# Patient Record
Sex: FEMALE | Race: WHITE | NOT HISPANIC OR LATINO | URBAN - METROPOLITAN AREA
[De-identification: names, ages, dates, MRNs, and addresses within clinical notes are randomized per-mention and may not be internally consistent; named-entity substitution may affect disease eponyms.]

---

## 2017-01-26 ENCOUNTER — APPOINTMENT (OUTPATIENT)
Dept: WOMENS IMAGING | Facility: HOSPITAL | Age: 47
End: 2017-01-26

## 2017-01-26 PROCEDURE — 77067 SCR MAMMO BI INCL CAD: CPT | Performed by: RADIOLOGY

## 2017-02-20 ENCOUNTER — LAB (OUTPATIENT)
Dept: LAB | Facility: HOSPITAL | Age: 47
End: 2017-02-20

## 2017-02-20 ENCOUNTER — CONSULT (OUTPATIENT)
Dept: ONCOLOGY | Facility: CLINIC | Age: 47
End: 2017-02-20

## 2017-02-20 VITALS
BODY MASS INDEX: 32.04 KG/M2 | HEART RATE: 77 BPM | TEMPERATURE: 98 F | WEIGHT: 211.4 LBS | OXYGEN SATURATION: 99 % | SYSTOLIC BLOOD PRESSURE: 128 MMHG | DIASTOLIC BLOOD PRESSURE: 74 MMHG | HEIGHT: 68 IN | RESPIRATION RATE: 16 BRPM

## 2017-02-20 DIAGNOSIS — Z01.89 EXAMINATION FOR, LABORATORY: Primary | ICD-10-CM

## 2017-02-20 DIAGNOSIS — R07.2 PRECORDIAL PAIN: ICD-10-CM

## 2017-02-20 DIAGNOSIS — E53.8 B12 DEFICIENCY: ICD-10-CM

## 2017-02-20 DIAGNOSIS — E61.1 IRON DEFICIENCY: ICD-10-CM

## 2017-02-20 DIAGNOSIS — D75.839 THROMBOCYTOSIS: Primary | ICD-10-CM

## 2017-02-20 LAB
BASOPHILS # BLD AUTO: 0.05 10*3/MM3 (ref 0–0.1)
BASOPHILS NFR BLD AUTO: 0.8 % (ref 0–1.1)
DEPRECATED RDW RBC AUTO: 44.1 FL (ref 37–49)
EOSINOPHIL # BLD AUTO: 0.16 10*3/MM3 (ref 0–0.36)
EOSINOPHIL NFR BLD AUTO: 2.6 % (ref 1–5)
ERYTHROCYTE [DISTWIDTH] IN BLOOD BY AUTOMATED COUNT: 14.2 % (ref 11.7–14.5)
FERRITIN SERPL-MCNC: 17.3 NG/ML (ref 11–207)
HCT VFR BLD AUTO: 38.9 % (ref 34–45)
HGB BLD-MCNC: 12.5 G/DL (ref 11.5–14.9)
HOLD SPECIMEN: NORMAL
IMM GRANULOCYTES # BLD: 0.01 10*3/MM3 (ref 0–0.03)
IMM GRANULOCYTES NFR BLD: 0.2 % (ref 0–0.5)
IRON 24H UR-MRATE: 54 MCG/DL (ref 37–145)
IRON SATN MFR SERPL: 10 % (ref 14–48)
LYMPHOCYTES # BLD AUTO: 2.38 10*3/MM3 (ref 1–3.5)
LYMPHOCYTES NFR BLD AUTO: 38.2 % (ref 20–49)
MCH RBC QN AUTO: 27.5 PG (ref 27–33)
MCHC RBC AUTO-ENTMCNC: 32.1 G/DL (ref 32–35)
MCV RBC AUTO: 85.7 FL (ref 83–97)
MONOCYTES # BLD AUTO: 0.47 10*3/MM3 (ref 0.25–0.8)
MONOCYTES NFR BLD AUTO: 7.5 % (ref 4–12)
NEUTROPHILS # BLD AUTO: 3.16 10*3/MM3 (ref 1.5–7)
NEUTROPHILS NFR BLD AUTO: 50.7 % (ref 39–75)
NRBC BLD MANUAL-RTO: 0 /100 WBC (ref 0–0)
PLATELET # BLD AUTO: 350 10*3/MM3 (ref 150–375)
PMV BLD AUTO: 10 FL (ref 8.9–12.1)
RBC # BLD AUTO: 4.54 10*6/MM3 (ref 3.9–5)
TIBC SERPL-MCNC: 535 MCG/DL (ref 249–505)
TRANSFERRIN SERPL-MCNC: 382 MG/DL (ref 200–360)
VIT B12 BLD-MCNC: 460 PG/ML (ref 250–999)
WBC NRBC COR # BLD: 6.23 10*3/MM3 (ref 4–10)
WHOLE BLOOD HOLD SPECIMEN: NORMAL

## 2017-02-20 PROCEDURE — 85025 COMPLETE CBC W/AUTO DIFF WBC: CPT

## 2017-02-20 PROCEDURE — 82607 VITAMIN B-12: CPT | Performed by: INTERNAL MEDICINE

## 2017-02-20 PROCEDURE — 83540 ASSAY OF IRON: CPT | Performed by: INTERNAL MEDICINE

## 2017-02-20 PROCEDURE — 84466 ASSAY OF TRANSFERRIN: CPT | Performed by: INTERNAL MEDICINE

## 2017-02-20 PROCEDURE — 36415 COLL VENOUS BLD VENIPUNCTURE: CPT | Performed by: INTERNAL MEDICINE

## 2017-02-20 PROCEDURE — 82728 ASSAY OF FERRITIN: CPT | Performed by: INTERNAL MEDICINE

## 2017-02-20 PROCEDURE — 99244 OFF/OP CNSLTJ NEW/EST MOD 40: CPT | Performed by: INTERNAL MEDICINE

## 2017-02-20 RX ORDER — METOPROLOL TARTRATE 100 MG/1
100 TABLET ORAL
COMMUNITY

## 2017-02-20 RX ORDER — SPIRONOLACTONE 100 MG/1
100 TABLET, FILM COATED ORAL DAILY
COMMUNITY

## 2017-02-20 RX ORDER — FERROUS SULFATE 325(65) MG
325 TABLET ORAL DAILY
Start: 2017-02-20

## 2017-02-20 NOTE — PROGRESS NOTES
Subjective     REASON FOR CONSULTATION:  Provide an opinion on any further workup or treatment on:    · Thrombocytosis                       REQUESTING PHYSICIAN: Art Lorenz MD      RECORDS OBTAINED:  Records of the patients history including those obtained from the referring provider were reviewed and summarized in detail.    HISTORY OF PRESENT ILLNESS:      Margarette Miles is a 47 y.o. patient was referred by Art Lorenz MD for evaluation of thrombocytosis.    Patient was found to have a platelet count of 437,000 on 2/2/17.  Prior to that, labs showed a high normal platelet count.  The patient has no prior history of thrombosis.    Patient developed pain in the right leg and posterior aspect of the knee a month ago.  She was evaluated at an emergency department endovenous upper was done which was negative.  Shortly after, she started experiencing retrosternal chest pain.  It is associated with shortness of breath especially with activities.  There is no preceding trauma.  She is not having hemoptysis.  She does reports occasional cough productive of clear sputum.    Patient reports history of B12 deficiency.  She took B12 tablets in the past.    The patient reports history of hemorrhoids.  She is going to have a colonoscopy next month.      Past Medical History   Diagnosis Date   • H/O Pityriasis versicolor    • H/O Pleurisy    • H/O uterine leiomyoma    • Hypertension    • Obesity        Past Surgical History   Procedure Laterality Date   • Uterine fibroid surgery  10/14/2014     Laparoscopic ablation uterine fibroids.   • Tubal abdominal ligation  01/01/2006   • Colonoscopy  01/01/1993         MEDICATIONS:    Current Outpatient Prescriptions:   •  Cholecalciferol (VITAMIN D-3 PO), Take  by mouth Daily., Disp: , Rfl:   •  FOLIC ACID PO, Take  by mouth., Disp: , Rfl:   •  metoprolol tartrate (LOPRESSOR) 100 MG tablet, Take 100 mg by mouth., Disp: , Rfl:   •  Probiotic Product (PROBIOTIC DAILY PO), Take   "by mouth., Disp: , Rfl:   •  spironolactone (ALDACTONE) 100 MG tablet, Take 100 mg by mouth Daily., Disp: , Rfl:      ALLERGIES:  No Known Allergies     Social History     Social History   • Marital status: Unknown     Spouse name: N/A   • Number of children: N/A   • Years of education: N/A     Occupational History   • Not on file.     Social History Main Topics   • Smoking status: Former Smoker   • Smokeless tobacco: Not on file   • Alcohol use Not on file   • Drug use: Not on file   • Sexual activity: Not on file     Other Topics Concern   • Not on file     Social History Narrative       Cancer-related family history is not on file.    REVIEW OF SYSTEMS:  GENERAL:  No fever or chills. No weight change.    SKIN:  No rashes or lesions.  HEME/LYMPH: Thrombocytosis.  EYES:  No vision changes or diplopia.  ENT:  No mouth or gum bleeding, epistaxis, hoarseness or dysphagia.  RESPIRATORY:  Shortness of breath.  CVS:  Regular first and chest pain.  GI:  No abdominal pain, nausea, vomiting, constipation, diarrhea.  Patient reports hemorrhoids.  :  No dysuria, hematuria or increased frequency.   MUSCULOSKELETAL:  No bone pain or joint stiffness.  NEUROLOGICAL:  No dizziness, global weakness, loss of consciousness or seizures.  PSYCHIATRIC:  No anxiety, mood changes or depression.  .    Objective   VITAL SIGNS:  Vitals:    02/20/17 1449   BP: 128/74   Pulse: 77   Resp: 16   Temp: 98 °F (36.7 °C)   TempSrc: Oral   SpO2: 99%   Weight: 211 lb 6.4 oz (95.9 kg)   Height: 68.11\" (173 cm)  Comment: new ht/new pt   PainSc: 0-No pain       Wt Readings from Last 3 Encounters:   02/20/17 211 lb 6.4 oz (95.9 kg)       PHYSICAL EXAMINATION  GENERAL:  The patient appears in good general condition not in acute distress  SKIN:  No skin rashes or lesions. No ecchymosis or petechiae.  HEAD:  Normocephalic.  EYES: No jaundice. No pallor.   NECK:  Supple with good ROM. No thyromegaly or masses.  LYMPHATICS:  No cervical, supraclavicular or " axillary lymphadenopathy.  CHEST:  Lungs clear to auscultation. No added sounds.  CARDIAC:  Normal S1 & S2. No murmurs.  ABDOMEN:  Soft and non-tender. No palpable hepatosplenomegaly or masses.  EXTREMITIES:  No cyanosis or edema. No calf tenderness.  NEUROLOGICAL:  No focal neurological deficits.  .    RESULT REVIEW:     Results from last 7 days  Lab Units 02/20/17  1438   WBC 10*3/mm3 6.23   NEUTROS ABS 10*3/mm3 3.16   HEMOGLOBIN g/dL 12.5   HEMATOCRIT % 38.9   PLATELETS 10*3/mm3 350       Results from last 7 days  Lab Units 02/20/17  1432   FERRITIN ng/mL 17.30   IRON mcg/dL 54   TIBC mcg/dL 535*     Lab Results   Component Value Date    UYEBMTFC36 460 02/20/2017       CBC from 2/2/17 revealed a hemoglobin of 13.0, WBC 5600 platelets 437,000.    CBC from 2/11/15 revealed a hemoglobin of 11.2 WBC 5400 platelets 392,000    CBC from 10/14/14 revealed hemoglobin 10.8 and platelets of 348,000.    CBC from on 1/14/14 revealed hemoglobin 11.7 WBC 7300 platelets 379,000.      Assessment/Plan   1.  Thrombocytosis.  The highest platelet count was 437,000.  Patient did not have any recent infections.  WBC and hemoglobin are normal.  I obtained iron studies today and she does have iron deficiency which would explain the thrombocytosis.  Therefore, we will start patient on ferrous sulfate 325 mg once a day.  With treatment of the iron deficiency, we would expect improvement in the platelet count.    2.  Retrosternal chest pain for the past month associated with shortness of breath.  This is concerning for pulmonary embolism.  The patient has prior history of smoking and her family history is positive for multiple malignancies.  I recommended obtaining a CT scan of the chest with PE protocol.  This will be done this week and will contact her with the results.    3.  Iron deficiency area with the patient's positive family history for colon cancer, she is going to have a colonoscopy next month.    4.  History of B12 deficiency.   I obtained B12 level and it is normal.    If the patient's CT scan is negative, we will see her in follow-up in 2 months with CBC, ferritin and iron panel.        Damon Denise MD  02/20/17

## 2017-02-23 ENCOUNTER — HOSPITAL ENCOUNTER (OUTPATIENT)
Dept: CT IMAGING | Facility: HOSPITAL | Age: 47
Discharge: HOME | End: 2017-02-23
Attending: INTERNAL MEDICINE | Admitting: INTERNAL MEDICINE

## 2017-02-23 DIAGNOSIS — R07.2 PRECORDIAL PAIN: ICD-10-CM

## 2017-02-23 LAB — CREAT BLDA-MCNC: 0.8 MG/DL (ref 0.6–1.3)

## 2017-02-23 PROCEDURE — 71275 CT ANGIOGRAPHY CHEST: CPT

## 2017-02-23 PROCEDURE — 0 IOPAMIDOL PER 1 ML: Performed by: INTERNAL MEDICINE

## 2017-02-23 PROCEDURE — 82565 ASSAY OF CREATININE: CPT

## 2017-02-23 RX ADMIN — IOPAMIDOL 95 ML: 755 INJECTION, SOLUTION INTRAVENOUS at 11:47

## 2017-02-23 NOTE — NURSING NOTE
Look and let go for CT Scan of chest.  Dr. Mckeon said the scan was negative and the patient could go.  IV pulled

## 2017-03-28 PROBLEM — Z12.11 SCREENING FOR COLONIC NEOPLASIA: Status: ACTIVE | Noted: 2017-03-29

## 2017-05-10 VITALS
WEIGHT: 200 LBS | SYSTOLIC BLOOD PRESSURE: 140 MMHG | RESPIRATION RATE: 14 BRPM | SYSTOLIC BLOOD PRESSURE: 129 MMHG | TEMPERATURE: 97.3 F | DIASTOLIC BLOOD PRESSURE: 75 MMHG | RESPIRATION RATE: 13 BRPM | DIASTOLIC BLOOD PRESSURE: 73 MMHG | OXYGEN SATURATION: 98 % | SYSTOLIC BLOOD PRESSURE: 120 MMHG | RESPIRATION RATE: 19 BRPM | DIASTOLIC BLOOD PRESSURE: 78 MMHG | HEART RATE: 65 BPM | RESPIRATION RATE: 30 BRPM | DIASTOLIC BLOOD PRESSURE: 76 MMHG | SYSTOLIC BLOOD PRESSURE: 131 MMHG | HEART RATE: 60 BPM | HEIGHT: 67 IN | OXYGEN SATURATION: 99 % | DIASTOLIC BLOOD PRESSURE: 66 MMHG | DIASTOLIC BLOOD PRESSURE: 104 MMHG | HEART RATE: 64 BPM | SYSTOLIC BLOOD PRESSURE: 119 MMHG | HEART RATE: 66 BPM | DIASTOLIC BLOOD PRESSURE: 94 MMHG | RESPIRATION RATE: 16 BRPM | HEART RATE: 68 BPM | SYSTOLIC BLOOD PRESSURE: 150 MMHG | TEMPERATURE: 96.8 F | HEART RATE: 56 BPM | HEART RATE: 75 BPM | OXYGEN SATURATION: 100 %

## 2017-05-11 ENCOUNTER — AMBULATORY SURGICAL CENTER (AMBULATORY)
Dept: URBAN - METROPOLITAN AREA SURGERY 17 | Facility: SURGERY | Age: 47
End: 2017-05-11
Payer: COMMERCIAL

## 2017-05-11 DIAGNOSIS — Z12.11 ENCOUNTER FOR SCREENING FOR MALIGNANT NEOPLASM OF COLON: ICD-10-CM

## 2017-05-11 PROCEDURE — 45378 DIAGNOSTIC COLONOSCOPY: CPT | Performed by: INTERNAL MEDICINE

## 2017-05-11 RX ADMIN — LIDOCAINE HYDROCHLORIDE 25 MG: 10 INJECTION, SOLUTION EPIDURAL; INFILTRATION; INTRACAUDAL; PERINEURAL at 09:33

## 2017-05-11 RX ADMIN — PROPOFOL 50 MG: 10 INJECTION, EMULSION INTRAVENOUS at 09:40

## 2017-05-11 RX ADMIN — PROPOFOL 100 MG: 10 INJECTION, EMULSION INTRAVENOUS at 09:34

## 2018-03-22 ENCOUNTER — APPOINTMENT (OUTPATIENT)
Dept: WOMENS IMAGING | Facility: HOSPITAL | Age: 48
End: 2018-03-22

## 2018-03-22 PROCEDURE — 77063 BREAST TOMOSYNTHESIS BI: CPT | Performed by: RADIOLOGY

## 2018-03-22 PROCEDURE — 77067 SCR MAMMO BI INCL CAD: CPT | Performed by: RADIOLOGY

## 2019-05-17 ENCOUNTER — APPOINTMENT (OUTPATIENT)
Dept: WOMENS IMAGING | Facility: HOSPITAL | Age: 49
End: 2019-05-17

## 2019-05-17 PROCEDURE — 77063 BREAST TOMOSYNTHESIS BI: CPT | Performed by: RADIOLOGY

## 2019-05-17 PROCEDURE — 77067 SCR MAMMO BI INCL CAD: CPT | Performed by: RADIOLOGY

## 2022-01-19 ENCOUNTER — APPOINTMENT (OUTPATIENT)
Dept: WOMENS IMAGING | Facility: HOSPITAL | Age: 52
End: 2022-01-19

## 2022-01-19 PROCEDURE — 77063 BREAST TOMOSYNTHESIS BI: CPT | Performed by: RADIOLOGY

## 2022-01-19 PROCEDURE — 77067 SCR MAMMO BI INCL CAD: CPT | Performed by: RADIOLOGY

## 2023-04-27 ENCOUNTER — HOSPITAL ENCOUNTER (EMERGENCY)
Facility: HOSPITAL | Age: 53
Discharge: HOME | End: 2023-04-27
Attending: EMERGENCY MEDICINE
Payer: COMMERCIAL

## 2023-04-27 ENCOUNTER — APPOINTMENT (OUTPATIENT)
Dept: GENERAL RADIOLOGY | Facility: HOSPITAL | Age: 53
End: 2023-04-27
Payer: COMMERCIAL

## 2023-04-27 VITALS
DIASTOLIC BLOOD PRESSURE: 94 MMHG | RESPIRATION RATE: 18 BRPM | WEIGHT: 210 LBS | SYSTOLIC BLOOD PRESSURE: 135 MMHG | TEMPERATURE: 98.6 F | BODY MASS INDEX: 32.96 KG/M2 | OXYGEN SATURATION: 98 % | HEIGHT: 67 IN | HEART RATE: 59 BPM

## 2023-04-27 DIAGNOSIS — R07.9 CHEST PAIN, UNSPECIFIED TYPE: Primary | ICD-10-CM

## 2023-04-27 LAB
ALBUMIN SERPL-MCNC: 4.1 G/DL (ref 3.5–5.2)
ALBUMIN/GLOB SERPL: 1.4 G/DL
ALP SERPL-CCNC: 85 U/L (ref 39–117)
ALT SERPL W P-5'-P-CCNC: 10 U/L (ref 1–33)
ANION GAP SERPL CALCULATED.3IONS-SCNC: 9.3 MMOL/L (ref 5–15)
AST SERPL-CCNC: 12 U/L (ref 1–32)
BASOPHILS # BLD AUTO: 0.04 10*3/MM3 (ref 0–0.2)
BASOPHILS NFR BLD AUTO: 0.7 % (ref 0–1.5)
BILIRUB SERPL-MCNC: <0.2 MG/DL (ref 0–1.2)
BUN SERPL-MCNC: 16 MG/DL (ref 6–20)
BUN/CREAT SERPL: 21.3 (ref 7–25)
CALCIUM SPEC-SCNC: 8.8 MG/DL (ref 8.6–10.5)
CHLORIDE SERPL-SCNC: 106 MMOL/L (ref 98–107)
CO2 SERPL-SCNC: 21.7 MMOL/L (ref 22–29)
CREAT SERPL-MCNC: 0.75 MG/DL (ref 0.57–1)
D DIMER PPP FEU-MCNC: 0.32 MCGFEU/ML (ref 0–0.52)
DEPRECATED RDW RBC AUTO: 46.4 FL (ref 37–54)
EGFRCR SERPLBLD CKD-EPI 2021: 95.9 ML/MIN/1.73
EOSINOPHIL # BLD AUTO: 0.08 10*3/MM3 (ref 0–0.4)
EOSINOPHIL NFR BLD AUTO: 1.4 % (ref 0.3–6.2)
ERYTHROCYTE [DISTWIDTH] IN BLOOD BY AUTOMATED COUNT: 14.6 % (ref 12.3–15.4)
GLOBULIN UR ELPH-MCNC: 3 GM/DL
GLUCOSE SERPL-MCNC: 109 MG/DL (ref 65–99)
HCT VFR BLD AUTO: 41.9 % (ref 34–46.6)
HGB BLD-MCNC: 13.3 G/DL (ref 12–15.9)
IMM GRANULOCYTES # BLD AUTO: 0.02 10*3/MM3 (ref 0–0.05)
IMM GRANULOCYTES NFR BLD AUTO: 0.3 % (ref 0–0.5)
LYMPHOCYTES # BLD AUTO: 2.2 10*3/MM3 (ref 0.7–3.1)
LYMPHOCYTES NFR BLD AUTO: 37.4 % (ref 19.6–45.3)
MCH RBC QN AUTO: 26.9 PG (ref 26.6–33)
MCHC RBC AUTO-ENTMCNC: 31.7 G/DL (ref 31.5–35.7)
MCV RBC AUTO: 84.8 FL (ref 79–97)
MONOCYTES # BLD AUTO: 0.39 10*3/MM3 (ref 0.1–0.9)
MONOCYTES NFR BLD AUTO: 6.6 % (ref 5–12)
NEUTROPHILS NFR BLD AUTO: 3.15 10*3/MM3 (ref 1.7–7)
NEUTROPHILS NFR BLD AUTO: 53.6 % (ref 42.7–76)
PLATELET # BLD AUTO: 380 10*3/MM3 (ref 140–450)
PMV BLD AUTO: 10.5 FL (ref 6–12)
POTASSIUM SERPL-SCNC: 4.1 MMOL/L (ref 3.5–5.2)
PROT SERPL-MCNC: 7.1 G/DL (ref 6–8.5)
RBC # BLD AUTO: 4.94 10*6/MM3 (ref 3.77–5.28)
SODIUM SERPL-SCNC: 137 MMOL/L (ref 136–145)
TROPONIN T SERPL HS-MCNC: <6 NG/L
WBC NRBC COR # BLD: 5.88 10*3/MM3 (ref 3.4–10.8)

## 2023-04-27 PROCEDURE — 85025 COMPLETE CBC W/AUTO DIFF WBC: CPT | Performed by: EMERGENCY MEDICINE

## 2023-04-27 PROCEDURE — 99283 EMERGENCY DEPT VISIT LOW MDM: CPT

## 2023-04-27 PROCEDURE — 85379 FIBRIN DEGRADATION QUANT: CPT | Performed by: EMERGENCY MEDICINE

## 2023-04-27 PROCEDURE — 93005 ELECTROCARDIOGRAM TRACING: CPT | Performed by: EMERGENCY MEDICINE

## 2023-04-27 PROCEDURE — 71046 X-RAY EXAM CHEST 2 VIEWS: CPT

## 2023-04-27 PROCEDURE — 84484 ASSAY OF TROPONIN QUANT: CPT | Performed by: EMERGENCY MEDICINE

## 2023-04-27 PROCEDURE — 80053 COMPREHEN METABOLIC PANEL: CPT | Performed by: EMERGENCY MEDICINE

## 2023-04-27 RX ORDER — SODIUM CHLORIDE 0.9 % (FLUSH) 0.9 %
10 SYRINGE (ML) INJECTION AS NEEDED
Status: DISCONTINUED | OUTPATIENT
Start: 2023-04-27 | End: 2023-04-27 | Stop reason: HOSPADM

## 2023-04-27 NOTE — FSED PROVIDER NOTE
Subjective   History of Present Illness  53-year-old female presents to the emergency room complaining of shortness of breath and substernal chest pain that radiates up to her neck and jaw denies any cough or congestion patient reports she was at rest when this occurred and lasted for few seconds and then resolved denies any nausea vomiting diarrhea denies any back pain or flank pain denies any urinary symptoms patient reports the pain is resolved patient is now in ED for further evaluation        Review of Systems   Constitutional: Negative.    HENT: Negative.    Eyes: Negative.    Respiratory: Positive for shortness of breath.    Cardiovascular: Positive for chest pain.   Gastrointestinal: Negative.    Endocrine: Negative.    Genitourinary: Negative.    Musculoskeletal: Negative.    Skin: Negative.    Allergic/Immunologic: Negative.    Neurological: Negative.    Hematological: Negative.    Psychiatric/Behavioral: Negative.        Past Medical History:   Diagnosis Date   • Asthma    • H/O Pityriasis versicolor    • H/O Pleurisy    • H/O uterine leiomyoma    • Hypertension    • Mitral valve prolapse    • Obesity        No Known Allergies    Past Surgical History:   Procedure Laterality Date   • COLONOSCOPY  01/01/1993   • TUBAL ABDOMINAL LIGATION  01/01/2006   • UTERINE FIBROID SURGERY  10/14/2014    Laparoscopic ablation uterine fibroids.       Family History   Problem Relation Age of Onset   • Heart disease Mother    • Hypertension Mother    • Leukemia Sister    • Cancer Maternal Uncle    • Pancreatic cancer Maternal Grandmother    • Bone cancer Maternal Grandfather    • Colon cancer Maternal Uncle        Social History     Socioeconomic History   • Marital status: Unknown     Spouse name: Shemar   • Years of education: College   Tobacco Use   • Smoking status: Former     Types: Cigars   Substance and Sexual Activity   • Alcohol use: Yes     Comment: Occasional   • Drug use: Yes     Types: Marijuana            Objective   Physical Exam  Vitals and nursing note reviewed.   Constitutional:       Appearance: Normal appearance.   HENT:      Head: Normocephalic and atraumatic.      Right Ear: Tympanic membrane normal.      Left Ear: Tympanic membrane normal.      Nose: Nose normal.      Mouth/Throat:      Mouth: Mucous membranes are moist.      Pharynx: Oropharynx is clear.   Eyes:      Extraocular Movements: Extraocular movements intact.      Conjunctiva/sclera: Conjunctivae normal.      Pupils: Pupils are equal, round, and reactive to light.   Cardiovascular:      Rate and Rhythm: Normal rate and regular rhythm.      Pulses: Normal pulses.      Heart sounds: Normal heart sounds.   Pulmonary:      Effort: Pulmonary effort is normal.      Breath sounds: Normal breath sounds.   Abdominal:      General: Abdomen is flat.      Palpations: Abdomen is soft.   Musculoskeletal:         General: Normal range of motion.      Cervical back: Normal range of motion and neck supple.   Skin:     General: Skin is warm and dry.      Capillary Refill: Capillary refill takes less than 2 seconds.   Neurological:      General: No focal deficit present.      Mental Status: She is alert and oriented to person, place, and time.   Psychiatric:         Mood and Affect: Mood normal.         Behavior: Behavior normal.         ECG 12 Lead      Date/Time: 4/27/2023 2:35 PM  Performed by: Tobi Evans MD  Authorized by: Tobi Evans MD   Interpreted by physician  Rhythm: sinus rhythm  BPM: 68                   ED Course                                           Medical Decision Making  Patient's EKG does show T wave inversion in V1 V2 cardiac enzyme is negative patient is chest pain-free patient has some anterior septal T wave inversions however D-dimer is within normal limits, patient is 100% sat with 18 respirations patient heart rate is 59 in no apparent distress advised patient to follow-up with cardiology recommend close return precautions  patient expressed with the treatment plan will discharge at this time    Amount and/or Complexity of Data Reviewed  Labs: ordered.  Radiology: ordered.     Details: Narrative & Impression  XR CHEST 2 VW     Date of Exam: 4/27/2023 1:26 PM EDT     Indication: Chest Pain Triage Protocol     Comparison: CT chest 2/23/2017.     Findings:  Clear lungs. Normal heart size but no pleural effusion or pneumothorax or acute osseous abnormality.     IMPRESSION:  Impression:  No acute chest finding.        Electronically Signed: Liat Rios    4/27/2023 1:38 PM EDT    Workstation ID: [id number]      ECG/medicine tests: ordered.      Risk  Prescription drug management.          Final diagnoses:   None       ED Disposition  ED Disposition     None          No follow-up provider specified.       Medication List      No changes were made to your prescriptions during this visit.

## 2023-04-27 NOTE — FSED PROVIDER NOTE
Subjective   History of Present Illness  53-year-old female presents to the emergency room with sudden onset chest pain that radiates up to her neck and jaw that started while she was at work denies any cough or congestion denies any nausea vomiting diarrhea patient reports the pain is resolved denies any chronic cardiac history but does report a strong family history of cardiac disease denies any lower extremity swelling patient is back to baseline now in ED for further eval        Review of Systems   Constitutional: Negative.    HENT: Negative.    Eyes: Negative.    Respiratory: Positive for shortness of breath.    Cardiovascular: Positive for chest pain.   Gastrointestinal: Negative.    Endocrine: Negative.    Genitourinary: Negative.    Musculoskeletal: Negative.    Skin: Negative.    Allergic/Immunologic: Negative.    Neurological: Negative.    Hematological: Negative.    Psychiatric/Behavioral: Negative.        Past Medical History:   Diagnosis Date   • Asthma    • H/O Pityriasis versicolor    • H/O Pleurisy    • H/O uterine leiomyoma    • Hypertension    • Mitral valve prolapse    • Obesity        No Known Allergies    Past Surgical History:   Procedure Laterality Date   • COLONOSCOPY  01/01/1993   • TUBAL ABDOMINAL LIGATION  01/01/2006   • UTERINE FIBROID SURGERY  10/14/2014    Laparoscopic ablation uterine fibroids.       Family History   Problem Relation Age of Onset   • Heart disease Mother    • Hypertension Mother    • Leukemia Sister    • Cancer Maternal Uncle    • Pancreatic cancer Maternal Grandmother    • Bone cancer Maternal Grandfather    • Colon cancer Maternal Uncle        Social History     Socioeconomic History   • Marital status: Unknown     Spouse name: Shemar   • Years of education: College   Tobacco Use   • Smoking status: Former     Types: Cigars   Substance and Sexual Activity   • Alcohol use: Yes     Comment: Occasional   • Drug use: Yes     Types: Marijuana           Objective    Physical Exam  Vitals and nursing note reviewed.   Constitutional:       Appearance: Normal appearance.   HENT:      Head: Normocephalic and atraumatic.      Right Ear: Tympanic membrane normal.      Left Ear: Tympanic membrane normal.      Nose: Nose normal.      Mouth/Throat:      Mouth: Mucous membranes are moist.      Pharynx: Oropharynx is clear.   Eyes:      Extraocular Movements: Extraocular movements intact.      Conjunctiva/sclera: Conjunctivae normal.      Pupils: Pupils are equal, round, and reactive to light.   Cardiovascular:      Rate and Rhythm: Normal rate and regular rhythm.      Pulses: Normal pulses.      Heart sounds: Normal heart sounds.   Pulmonary:      Effort: Pulmonary effort is normal.      Breath sounds: Normal breath sounds.   Abdominal:      General: Abdomen is flat.      Palpations: Abdomen is soft.   Musculoskeletal:         General: Normal range of motion.      Cervical back: Normal range of motion and neck supple.   Skin:     General: Skin is warm and dry.      Capillary Refill: Capillary refill takes less than 2 seconds.   Neurological:      General: No focal deficit present.      Mental Status: She is alert and oriented to person, place, and time.   Psychiatric:         Mood and Affect: Mood normal.         Behavior: Behavior normal.         ECG 12 Lead      Date/Time: 4/27/2023 3:19 PM  Performed by: Tobi vEans MD  Authorized by: Tobi Evans MD   Interpreted by physician  Rhythm: sinus rhythm  BPM: 68  Clinical impression: non-specific ECG  Comments: No STEMI T wave inversions in V1 V2                 ED Course                      HEART Score: 1                      Medical Decision Making  Patient has T wave inversions in V1 V2 as well as V3 however D-dimer is within normal limits troponin is normal patient's chest pain has resolved advised patient to follow-up with primary care doctor as well as cardiology for outpatient stress testing patient is understandable and  agreeable to the treatment plan will discharge patient this time recommend close return precautions    Chest pain, unspecified type: acute illness or injury  Amount and/or Complexity of Data Reviewed  Labs: ordered.  Radiology: ordered.     Details:   XR Chest 2 View (Final result)  Result time 04/27/23 13:38:07  Final result             Impression:     Impression:   No acute chest finding.       Electronically Signed: Liat Rios    4/27/2023 1:38 PM EDT    Workstation ID: [id number]        Narrative:     XR CHEST 2 VW     Date of Exam: 4/27/2023 1:26 PM EDT     Indication: Chest Pain Triage Protocol     Comparison: CT chest 2/23/2017.     Findings:   Clear lungs. Normal heart size but no pleural effusion or pneumothorax or acute osseous abnormality.                ECG/medicine tests: ordered and independent interpretation performed.      Risk  Prescription drug management.          Final diagnoses:   Chest pain, unspecified type       ED Disposition  ED Disposition     ED Disposition   Discharge    Condition   Stable    Comment   --             Art Lorenz MD  [address] [id number] [phone]          Keke Oates MD  [address] [facility] [address] [phone]               Medication List      No changes were made to your prescriptions during this visit.

## 2023-04-28 LAB — QT INTERVAL: 389 MS

## 2024-01-12 ENCOUNTER — HOSPITAL ENCOUNTER (EMERGENCY)
Facility: HOSPITAL | Age: 54
Discharge: HOME | End: 2024-01-13
Attending: EMERGENCY MEDICINE
Payer: COMMERCIAL

## 2024-01-12 ENCOUNTER — APPOINTMENT (OUTPATIENT)
Dept: CT IMAGING | Facility: HOSPITAL | Age: 54
End: 2024-01-12
Payer: COMMERCIAL

## 2024-01-12 DIAGNOSIS — H65.192 ACUTE EFFUSION OF LEFT EAR: ICD-10-CM

## 2024-01-12 DIAGNOSIS — H57.12 LEFT EYE PAIN: Primary | ICD-10-CM

## 2024-01-12 LAB
BASOPHILS # BLD AUTO: 0.03 10*3/MM3 (ref 0–0.2)
BASOPHILS NFR BLD AUTO: 0.5 % (ref 0–1.5)
DEPRECATED RDW RBC AUTO: 48.2 FL (ref 37–54)
EOSINOPHIL # BLD AUTO: 0.09 10*3/MM3 (ref 0–0.4)
EOSINOPHIL NFR BLD AUTO: 1.5 % (ref 0.3–6.2)
ERYTHROCYTE [DISTWIDTH] IN BLOOD BY AUTOMATED COUNT: 15.1 % (ref 12.3–15.4)
HCT VFR BLD AUTO: 41.3 % (ref 34–46.6)
HGB BLD-MCNC: 12.9 G/DL (ref 12–15.9)
IMM GRANULOCYTES # BLD AUTO: 0.01 10*3/MM3 (ref 0–0.05)
IMM GRANULOCYTES NFR BLD AUTO: 0.2 % (ref 0–0.5)
LYMPHOCYTES # BLD AUTO: 1.9 10*3/MM3 (ref 0.7–3.1)
LYMPHOCYTES NFR BLD AUTO: 32.4 % (ref 19.6–45.3)
MCH RBC QN AUTO: 27 PG (ref 26.6–33)
MCHC RBC AUTO-ENTMCNC: 31.2 G/DL (ref 31.5–35.7)
MCV RBC AUTO: 86.4 FL (ref 79–97)
MONOCYTES # BLD AUTO: 0.42 10*3/MM3 (ref 0.1–0.9)
MONOCYTES NFR BLD AUTO: 7.2 % (ref 5–12)
NEUTROPHILS NFR BLD AUTO: 3.42 10*3/MM3 (ref 1.7–7)
NEUTROPHILS NFR BLD AUTO: 58.2 % (ref 42.7–76)
PLATELET # BLD AUTO: 311 10*3/MM3 (ref 140–450)
PMV BLD AUTO: 10.4 FL (ref 6–12)
RBC # BLD AUTO: 4.78 10*6/MM3 (ref 3.77–5.28)
WBC NRBC COR # BLD AUTO: 5.87 10*3/MM3 (ref 3.4–10.8)

## 2024-01-12 PROCEDURE — 80048 BASIC METABOLIC PNL TOTAL CA: CPT | Performed by: EMERGENCY MEDICINE

## 2024-01-12 PROCEDURE — 70450 CT HEAD/BRAIN W/O DYE: CPT

## 2024-01-12 PROCEDURE — 85652 RBC SED RATE AUTOMATED: CPT | Performed by: EMERGENCY MEDICINE

## 2024-01-12 PROCEDURE — 99284 EMERGENCY DEPT VISIT MOD MDM: CPT

## 2024-01-12 PROCEDURE — 85025 COMPLETE CBC W/AUTO DIFF WBC: CPT | Performed by: EMERGENCY MEDICINE

## 2024-01-12 PROCEDURE — 36415 COLL VENOUS BLD VENIPUNCTURE: CPT

## 2024-01-12 PROCEDURE — 99283 EMERGENCY DEPT VISIT LOW MDM: CPT | Performed by: EMERGENCY MEDICINE

## 2024-01-13 VITALS
TEMPERATURE: 97.9 F | WEIGHT: 210 LBS | RESPIRATION RATE: 17 BRPM | SYSTOLIC BLOOD PRESSURE: 138 MMHG | HEART RATE: 64 BPM | BODY MASS INDEX: 32.96 KG/M2 | OXYGEN SATURATION: 100 % | HEIGHT: 67 IN | DIASTOLIC BLOOD PRESSURE: 87 MMHG

## 2024-01-13 LAB
ANION GAP SERPL CALCULATED.3IONS-SCNC: 6.2 MMOL/L (ref 5–15)
BUN SERPL-MCNC: 17 MG/DL (ref 6–20)
BUN/CREAT SERPL: 20.2 (ref 7–25)
CALCIUM SPEC-SCNC: 9.4 MG/DL (ref 8.6–10.5)
CHLORIDE SERPL-SCNC: 105 MMOL/L (ref 98–107)
CO2 SERPL-SCNC: 24.8 MMOL/L (ref 22–29)
CREAT SERPL-MCNC: 0.84 MG/DL (ref 0.57–1)
EGFRCR SERPLBLD CKD-EPI 2021: 83.7 ML/MIN/1.73
ERYTHROCYTE [SEDIMENTATION RATE] IN BLOOD: 46 MM/HR (ref 0–30)
GLUCOSE SERPL-MCNC: 97 MG/DL (ref 65–99)
POTASSIUM SERPL-SCNC: 3.8 MMOL/L (ref 3.5–5.2)
SODIUM SERPL-SCNC: 136 MMOL/L (ref 136–145)

## 2024-01-13 RX ORDER — CETIRIZINE HYDROCHLORIDE 10 MG/1
10 TABLET ORAL DAILY
Qty: 15 TABLET | Refills: 0 | Status: SHIPPED | OUTPATIENT
Start: 2024-01-13

## 2024-01-13 RX ORDER — TETRACAINE HYDROCHLORIDE 5 MG/ML
2 SOLUTION OPHTHALMIC ONCE
Status: COMPLETED | OUTPATIENT
Start: 2024-01-13 | End: 2024-01-13

## 2024-01-13 RX ADMIN — TETRACAINE HYDROCHLORIDE 2 DROP: 5 SOLUTION OPHTHALMIC at 00:22

## 2024-01-13 RX ADMIN — FLUORESCEIN SODIUM 1 STRIP: 1 STRIP OPHTHALMIC at 00:22

## 2024-01-13 NOTE — ED NOTES
Pt reports pressure behind left eye and slight HA head , at work today air tool hit her on left side of safety glasses. Didn't hit her head but felt a impact when it ht her left goggle.  Denies and dizziness or vomiting

## 2024-01-13 NOTE — DISCHARGE INSTRUCTIONS
Take medication as prescribed. Follow-up with your Doctor this week. Return to the ER if you experience return of pain, change in vision or for any questions or concerns.

## 2024-01-13 NOTE — FSED PROVIDER NOTE
Subjective   History of Present Illness  53 yof complains of left eye pressure that started today. She notes she was hit today at work by a tool. She reports the pain comes and goes. No change in her vision. The patient is not currently having pain in her eye.       Review of Systems   Constitutional: Negative.    Eyes:  Positive for pain. Negative for photophobia, discharge, itching and visual disturbance.   Gastrointestinal: Negative.  Negative for nausea and vomiting.   Musculoskeletal: Negative.    Neurological:  Positive for headaches.   All other systems reviewed and are negative.      Past Medical History:   Diagnosis Date    Asthma     H/O Pityriasis versicolor     H/O Pleurisy     H/O uterine leiomyoma     Hypertension     Mitral valve prolapse     Obesity        No Known Allergies    Past Surgical History:   Procedure Laterality Date    COLONOSCOPY  01/01/1993    TUBAL ABDOMINAL LIGATION  01/01/2006    UTERINE FIBROID SURGERY  10/14/2014    Laparoscopic ablation uterine fibroids.       Family History   Problem Relation Age of Onset    Heart disease Mother     Hypertension Mother     Leukemia Sister     Cancer Maternal Uncle     Pancreatic cancer Maternal Grandmother     Bone cancer Maternal Grandfather     Colon cancer Maternal Uncle        Social History     Socioeconomic History    Marital status:      Spouse name: Shemar    Years of education: College   Tobacco Use    Smoking status: Former     Types: Cigars   Substance and Sexual Activity    Alcohol use: Yes     Comment: Occasional    Drug use: Yes     Types: Marijuana           Objective   Physical Exam  Vitals reviewed.   Constitutional:       General: She is not in acute distress.     Appearance: Normal appearance. She is not ill-appearing.   HENT:      Head: Normocephalic and atraumatic.      Right Ear: Tympanic membrane, ear canal and external ear normal.      Left Ear: Ear canal and external ear normal. A middle ear effusion is present. No  mastoid tenderness. Tympanic membrane is not injected, perforated or erythematous.      Mouth/Throat:      Mouth: Mucous membranes are moist.      Pharynx: Oropharynx is clear.   Eyes:      General: Lids are normal. Vision grossly intact. Gaze aligned appropriately. No visual field deficit.     Extraocular Movements: Extraocular movements intact.      Conjunctiva/sclera: Conjunctivae normal.      Right eye: Right conjunctiva is not injected. No chemosis, exudate or hemorrhage.     Left eye: Left conjunctiva is not injected. No chemosis, exudate or hemorrhage.     Pupils: Pupils are equal, round, and reactive to light.      Right eye: No corneal abrasion or fluorescein uptake.      Left eye: No corneal abrasion or fluorescein uptake.      Visual Fields: Right eye visual fields normal and left eye visual fields normal.   Cardiovascular:      Rate and Rhythm: Normal rate and regular rhythm.   Musculoskeletal:      Cervical back: Normal range of motion and neck supple.   Neurological:      Mental Status: She is alert.         Procedures           ED Course  ED Course as of 01/14/24 0140   Sat Jan 13, 2024   0050 Pt is currently not having eye pain or changes in her vision. The patient has an appointment with her eye Doctor this week.  [BM]      ED Course User Index  [BM] Carmita Rivers MD                                    Sed rate < 50       Medical Decision Making  Problems Addressed:  Acute effusion of left ear: complicated acute illness or injury  Left eye pain: complicated acute illness or injury    Amount and/or Complexity of Data Reviewed  Labs: ordered.  Radiology: ordered.    Risk  OTC drugs.  Prescription drug management.        Final diagnoses:   Left eye pain   Acute effusion of left ear       ED Disposition  ED Disposition       ED Disposition   Discharge    Condition   Stable    Comment   --               Art Lorenz MD  [facility] [address]  [phone]    Schedule an  appointment as soon as possible for a visit on 1/15/2024           Medication List        New Prescriptions      cetirizine 10 MG tablet  Commonly known as: zyrTEC  Take 1 tablet by mouth Daily.     diclofenac 50 MG EC tablet  Commonly known as: VOLTAREN  Take 1 tablet by mouth 2 (Two) Times a Day As Needed (pain).               Where to Get Your Medications        [id number] - [facility] [place] - [address] AT [address] - [phone] [facility] [phone]   [address] [place] [address]      Phone: [phone]   cetirizine 10 MG tablet  diclofenac 50 MG EC tablet